# Patient Record
Sex: FEMALE | Race: WHITE | NOT HISPANIC OR LATINO | Employment: UNEMPLOYED | URBAN - NONMETROPOLITAN AREA
[De-identification: names, ages, dates, MRNs, and addresses within clinical notes are randomized per-mention and may not be internally consistent; named-entity substitution may affect disease eponyms.]

---

## 2023-07-25 ENCOUNTER — OFFICE VISIT (OUTPATIENT)
Dept: ORTHOPEDIC SURGERY | Facility: CLINIC | Age: 43
End: 2023-07-25
Payer: COMMERCIAL

## 2023-07-25 ENCOUNTER — HOSPITAL ENCOUNTER (OUTPATIENT)
Dept: GENERAL RADIOLOGY | Facility: HOSPITAL | Age: 43
Discharge: HOME | End: 2023-07-25
Admitting: PHYSICIAN ASSISTANT
Payer: COMMERCIAL

## 2023-07-25 VITALS — WEIGHT: 151.2 LBS | HEIGHT: 65 IN | BODY MASS INDEX: 25.19 KG/M2

## 2023-07-25 DIAGNOSIS — M25.512 LEFT SHOULDER PAIN, UNSPECIFIED CHRONICITY: ICD-10-CM

## 2023-07-25 DIAGNOSIS — R29.898 SHOULDER WEAKNESS: ICD-10-CM

## 2023-07-25 DIAGNOSIS — M25.512 CHRONIC LEFT SHOULDER PAIN: Primary | ICD-10-CM

## 2023-07-25 DIAGNOSIS — M25.612 DECREASED ROM OF LEFT SHOULDER: ICD-10-CM

## 2023-07-25 DIAGNOSIS — G89.29 CHRONIC LEFT SHOULDER PAIN: Primary | ICD-10-CM

## 2023-07-25 PROBLEM — M19.012 PRIMARY OSTEOARTHRITIS OF LEFT SHOULDER: Status: ACTIVE | Noted: 2022-12-08

## 2023-07-25 PROBLEM — Z90.710 POST-HYSTERECTOMY MENOPAUSE: Status: ACTIVE | Noted: 2023-07-12

## 2023-07-25 PROBLEM — L65.9 HAIR LOSS: Status: ACTIVE | Noted: 2022-08-08

## 2023-07-25 PROBLEM — R53.83 OTHER FATIGUE: Status: ACTIVE | Noted: 2022-08-08

## 2023-07-25 PROBLEM — M62.89 PELVIC FLOOR DYSFUNCTION: Status: ACTIVE | Noted: 2019-08-21

## 2023-07-25 PROBLEM — M79.18 MYOFASCIAL PAIN: Status: ACTIVE | Noted: 2023-06-22

## 2023-07-25 PROBLEM — E07.89 PAINFUL THYROID: Status: ACTIVE | Noted: 2022-08-08

## 2023-07-25 PROBLEM — H69.80 EUSTACHIAN TUBE DYSFUNCTION: Status: ACTIVE | Noted: 2021-06-23

## 2023-07-25 PROBLEM — K30 INDIGESTION: Status: ACTIVE | Noted: 2022-09-19

## 2023-07-25 PROBLEM — R94.39 ABNORMAL STRESS ECG WITH TREADMILL: Status: ACTIVE | Noted: 2022-02-16

## 2023-07-25 PROBLEM — K76.0 STEATOSIS OF LIVER: Status: ACTIVE | Noted: 2021-01-05

## 2023-07-25 PROBLEM — K21.9 GASTROESOPHAGEAL REFLUX DISEASE: Status: ACTIVE | Noted: 2023-06-22

## 2023-07-25 PROBLEM — K58.9 IRRITABLE BOWEL SYNDROME: Status: ACTIVE | Noted: 2018-01-12

## 2023-07-25 PROBLEM — R11.0 NAUSEA: Status: ACTIVE | Noted: 2019-04-17

## 2023-07-25 PROBLEM — M85.80 OSTEOPENIA: Status: ACTIVE | Noted: 2022-09-19

## 2023-07-25 PROBLEM — R06.2 WHEEZING: Status: ACTIVE | Noted: 2023-07-25

## 2023-07-25 PROBLEM — N95.1 MENOPAUSAL SYNDROME: Status: ACTIVE | Noted: 2022-09-19

## 2023-07-25 PROBLEM — F41.9 ANXIETY: Status: ACTIVE | Noted: 2021-10-07

## 2023-07-25 PROBLEM — R35.0 INCREASED FREQUENCY OF URINATION: Status: ACTIVE | Noted: 2022-09-19

## 2023-07-25 PROBLEM — J30.9 ALLERGIC RHINITIS: Status: ACTIVE | Noted: 2022-09-19

## 2023-07-25 PROBLEM — R10.13 EPIGASTRIC PAIN: Status: ACTIVE | Noted: 2023-07-11

## 2023-07-25 PROBLEM — E78.1 HYPERTRIGLYCERIDEMIA: Status: ACTIVE | Noted: 2021-06-23

## 2023-07-25 PROBLEM — Z98.890 PONV (POSTOPERATIVE NAUSEA AND VOMITING): Status: ACTIVE | Noted: 2023-07-19

## 2023-07-25 PROBLEM — R10.9 ABDOMINAL PAIN: Status: ACTIVE | Noted: 2023-07-25

## 2023-07-25 PROBLEM — E06.3 HASHIMOTO'S THYROIDITIS: Status: ACTIVE | Noted: 2022-08-08

## 2023-07-25 PROBLEM — F41.8 MIXED ANXIETY AND DEPRESSIVE DISORDER: Status: ACTIVE | Noted: 2021-06-23

## 2023-07-25 PROBLEM — J45.909 ASTHMA: Status: ACTIVE | Noted: 2019-11-03

## 2023-07-25 PROBLEM — B07.9 VERRUCA VULGARIS: Status: ACTIVE | Noted: 2022-09-19

## 2023-07-25 PROBLEM — R11.2 PONV (POSTOPERATIVE NAUSEA AND VOMITING): Status: ACTIVE | Noted: 2023-07-19

## 2023-07-25 PROBLEM — N30.10 CHRONIC INTERSTITIAL CYSTITIS: Status: ACTIVE | Noted: 2017-12-27

## 2023-07-25 PROBLEM — E89.40 POST-HYSTERECTOMY MENOPAUSE: Status: ACTIVE | Noted: 2023-07-12

## 2023-07-25 PROBLEM — R06.09 DYSPNEA ON EXERTION: Status: ACTIVE | Noted: 2022-02-16

## 2023-07-25 PROBLEM — K43.9 HERNIA OF ANTERIOR ABDOMINAL WALL: Status: ACTIVE | Noted: 2023-07-25

## 2023-07-25 PROBLEM — E78.5 HYPERLIPIDEMIA: Status: ACTIVE | Noted: 2023-07-25

## 2023-07-25 PROBLEM — I10 HYPERTENSIVE DISORDER: Status: ACTIVE | Noted: 2023-07-12

## 2023-07-25 PROBLEM — K44.9 HIATAL HERNIA: Status: ACTIVE | Noted: 2021-10-07

## 2023-07-25 PROBLEM — M94.0 COSTAL CHONDRITIS: Status: ACTIVE | Noted: 2022-09-19

## 2023-07-25 PROBLEM — R10.32 LEFT LOWER QUADRANT PAIN: Status: ACTIVE | Noted: 2023-07-25

## 2023-07-25 PROBLEM — M54.50 CHRONIC LEFT-SIDED LOW BACK PAIN WITHOUT SCIATICA: Status: ACTIVE | Noted: 2023-06-22

## 2023-07-25 PROBLEM — H69.90 EUSTACHIAN TUBE DYSFUNCTION: Status: ACTIVE | Noted: 2021-06-23

## 2023-07-25 PROBLEM — G47.00 INSOMNIA: Status: ACTIVE | Noted: 2022-09-19

## 2023-07-25 PROBLEM — R13.10 DYSPHAGIA: Status: ACTIVE | Noted: 2022-08-08

## 2023-07-25 PROBLEM — I10 BENIGN ESSENTIAL HTN: Status: ACTIVE | Noted: 2023-06-22

## 2023-07-25 PROBLEM — E04.1 THYROID NODULE: Status: ACTIVE | Noted: 2022-08-08

## 2023-07-25 PROBLEM — G58.8 CLUNEAL NEUROPATHY: Status: ACTIVE | Noted: 2023-06-22

## 2023-07-25 PROCEDURE — 73030 X-RAY EXAM OF SHOULDER: CPT

## 2023-07-25 RX ADMIN — METHYLPREDNISOLONE ACETATE 160 MG: 80 INJECTION, SUSPENSION INTRA-ARTICULAR; INTRALESIONAL; INTRAMUSCULAR; SOFT TISSUE at 16:26

## 2023-07-25 RX ADMIN — LIDOCAINE HYDROCHLORIDE 2 ML: 20 INJECTION, SOLUTION EPIDURAL; INFILTRATION; INTRACAUDAL; PERINEURAL at 16:26

## 2023-07-25 NOTE — PROGRESS NOTES
"Chief Complaint  Establish Care of the Left Shoulder    Subjective    History of Present Illness      Reyes England is a 42 y.o. female who presents to [facility] for complaint of Shoulder Pain: Patient complains of left shoulder pain.        This began in fall 2022. She denies any injury. The onset of pain was sudden. She reports pain and decreased range of motion. She notes weakness of the shoulder and reports not much improvement since onset. Dr. Hammer was giving her injections in the shoulder every 3 months, although it helped it never completely resolved her symptoms. She reports she had an MRI ordered by her PCP in 04/2022. She was informed she might have a labrum tear. Her pain is rated at a 9/10 and she reports it is painful to lay on the shoulder. The pain is located over the posterior and anterior aspects of the shoulder.      Objective   Vital Signs:   Ht 165.1 cm (65\")   Wt 68.6 kg (151 lb 3.2 oz)   BMI 25.16 kg/m²       Physical Exam  Vitals and nursing note reviewed.   Constitutional:       Appearance: Normal appearance.   Pulmonary:      Effort: Pulmonary effort is normal.   Skin:     General: Skin is warm and dry.      Capillary Refill: Capillary refill takes less than 2 seconds.   Neurological:      General: No focal deficit present.      Mental Status: He is alert and oriented to person, place, and time. Mental status is at baseline.   Psychiatric:         Mood and Affect: Mood normal.         Behavior: Behavior normal.         Thought Content: Thought content normal.         Judgment: Judgment normal.         Ortho Exam   LEFT shoulder  Range of motion is slightly compromised in forward flexion, abduction and external rotation.   Rotator cuff function appears well preserved.  The scapula is moving with the humerus upon attempted abduction.   The range of motion is abduction 0-110 degrees, forward flexion 0-110 degrees, external rotation 0-50 degrees.   There is " anterior joint line tenderness.   There is winging of the scapula.   Axillary nerve function is well preserved.   Radial artery pulses are palpable.       Result Review :   Radiologic studies - see below for interpretation  LEFT shoulder xrays   4 views were ordered by Hitesh Dan PA-C. Performed at [facility] on 7/25/2023. Images were independently viewed and interpreted by myself, my impression as follows:  Findings: Mild degenerative changes of the AC joint, glenohumeral joint appears well-preserved.  Bony lesion: no  Soft tissues: within normal limits  Joint spaces: subnormal  Hardware appropriately positioned: not applicable  Prior studies available for comparison: no    Reviewed MRI report of left shoulder without contrast, performed at  [facility] on 04/2022, summary of impression below:   AC joint narrowed with capsular hypertrophy and spurring  Type I acromion  Minimal mass effect, anterior downsloping  Mild supraspinatus tendinosis  Normal long head of biceps tendon, properly positioned within the bicipital groove  Some glenoid cartilage loss involving superior half, mild to moderate in severity  Minimal joint effusion  Labrum is suboptimally evaluated on nonarthrogram exam.  Suspect degenerative and linear tearing of the anterior inferior labrum, no other discrete linear tear.  Blunting of free edge margin of the posterior labrum from superior through inferior margin, could be degenerative          PROCEDURE  - Large Joint Arthrocentesis: L glenohumeral on 7/25/2023 4:26 PM  Indications: pain  Details: 25 G needle, posterior approach  Medications: 160 mg methylPREDNISolone acetate 80 MG/ML; 2 mL lidocaine PF 2% 2 %  Outcome: tolerated well, no immediate complications  Procedure, treatment alternatives, risks and benefits explained, specific risks discussed. Consent was given by the patient. Immediately prior to procedure a time out was called to verify the correct  patient, procedure, equipment, support staff and site/side marked as required.                Assessment   Assessment and Plan    Problem List Items Addressed This Visit          Musculoskeletal and Injuries    Chronic left shoulder pain - Primary    Relevant Orders    [place] Contrast Injection CT / MRI    MRI shoulder left arthrogram    - Large Joint Arthrocentesis: L glenohumeral    Decreased ROM of left shoulder    Relevant Orders    - Large Joint Arthrocentesis: L glenohumeral    Shoulder weakness       Follow Up   Discussion of any imaging in detail. Discussion of orthopaedic goals.  Risk, benefits, and merits of treatment alternatives reviewed with the patient. Treatment alternatives include: oral anti-inflammatories/NSAID, intra-articular steroid injection, physical therapy, and further imaging/testing.  Recommend further evaluation with MR arthrogram of left shoulder to better visualize the labrum.  I did discuss with patient if there is glenoid cartilage loss, most often labral surgery will not be recommended.  I also discussed with her my concern for possible early adhesive capsulitis.  We will proceed with intra-articular steroid injection and scheduling of MR arthrogram.  Ice, heat, and/or modalities as beneficial  Risks of minor surgical procedure/intra-articular injection, including but not limited to pain, bleeding, infection into the joint, pigment skin changes related to steroid administration, increased blood sugar levels secondary to steroid administration, scar, recurrence of pain, and tendon rupture associated with steroid administration and possible need for further surgery reviewed with patient.  She accepts these risks and wishes to proceed with procedure. Injected patient's left shoulder joint(s) with Depo-Medrol from a(n) posterior approach.  Patient tolerated the procedure well and no complications were encountered.   Watch for signs and symptoms of infection  Call or notify for any adverse  effect from injection therapy  Patient is encouraged to call or return for any issues or concerns.  Follow up will be based on when MRI has been performed  Patient was given instructions and counseling regarding her condition or for health maintenance advice. Please see specific information pulled into the AVS if appropriate.     Hitesh Dan PA-C    Date of Encounter: 7/25/2023     Transcribed from ambient dictation for Hitesh Dan PA-C by Viry Harrington.  07/25/23   18:23 EDT    Patient or patient representative verbalized consent to the visit recording.  I have personally performed the services described in this document as transcribed by the above individual, and it is both accurate and complete.  Hitesh Dan PA-C  7/26/2023  08:23 EDT

## 2023-07-26 ENCOUNTER — TELEPHONE (OUTPATIENT)
Dept: ORTHOPEDIC SURGERY | Facility: CLINIC | Age: 43
End: 2023-07-26

## 2023-07-26 RX ORDER — METHYLPREDNISOLONE ACETATE 80 MG/ML
160 INJECTION, SUSPENSION INTRA-ARTICULAR; INTRALESIONAL; INTRAMUSCULAR; SOFT TISSUE
Status: COMPLETED | OUTPATIENT
Start: 2023-07-25 | End: 2023-07-25

## 2023-07-26 RX ORDER — LIDOCAINE HYDROCHLORIDE 20 MG/ML
2 INJECTION, SOLUTION EPIDURAL; INFILTRATION; INTRACAUDAL; PERINEURAL
Status: COMPLETED | OUTPATIENT
Start: 2023-07-25 | End: 2023-07-25

## 2023-07-26 NOTE — TELEPHONE ENCOUNTER
Caller: Reyes England    Relationship: Self    Best call back number: [phone] (home)     Who are you requesting to speak with ([facility])    What was the call regarding: PATIENT SAW DR BRAVO YESTERDAY. SHE WAS GIVEN A   PHONE NUMBER TO CALL AND SCHEDULE AN APPOINTMENT FOR AN MRI shoulder left arthrogram  THE ONLY PLACE THEY DO IT IS IN [facility]. SHE DOESNT WANT TO GO TO [facility].I TRIED TRANSFER THE CALL TO [facility] AND LOST THE CALL. PLS GIVE PATIENT A CALL BACK.       I

## 2023-07-27 NOTE — TELEPHONE ENCOUNTER
I HONESTLY HAVE NO IDEA. I WILL CALL THE PATIENT BACK TO SEE WHAT FACILITY SHE HAS IN MIND AND THEN CHECK TO SEE IF THEY DO THE ARTHROGRAM/RJ

## 2023-09-18 ENCOUNTER — HOSPITAL ENCOUNTER (OUTPATIENT)
Dept: MRI IMAGING | Facility: HOSPITAL | Age: 43
Discharge: HOME | End: 2023-09-18
Payer: COMMERCIAL

## 2023-09-18 ENCOUNTER — HOSPITAL ENCOUNTER (OUTPATIENT)
Dept: INTERVENTIONAL RADIOLOGY/VASCULAR | Facility: HOSPITAL | Age: 43
Discharge: HOME | End: 2023-09-18
Payer: COMMERCIAL

## 2023-09-18 DIAGNOSIS — M25.512 CHRONIC LEFT SHOULDER PAIN: ICD-10-CM

## 2023-09-18 DIAGNOSIS — G89.29 CHRONIC LEFT SHOULDER PAIN: ICD-10-CM

## 2023-09-18 PROCEDURE — 25510000001 IOPAMIDOL 61 % SOLUTION: Performed by: PHYSICIAN ASSISTANT

## 2023-09-18 PROCEDURE — 0 GADOBENATE DIMEGLUMINE 529 MG/ML SOLUTION: Performed by: PHYSICIAN ASSISTANT

## 2023-09-18 PROCEDURE — A9577 INJ MULTIHANCE: HCPCS | Performed by: PHYSICIAN ASSISTANT

## 2023-09-18 PROCEDURE — 77002 NEEDLE LOCALIZATION BY XRAY: CPT

## 2023-09-18 PROCEDURE — 73222 MRI JOINT UPR EXTREM W/DYE: CPT

## 2023-09-18 RX ORDER — LIDOCAINE HYDROCHLORIDE 20 MG/ML
20 INJECTION, SOLUTION INFILTRATION; PERINEURAL ONCE
Status: COMPLETED | OUTPATIENT
Start: 2023-09-18 | End: 2023-09-18

## 2023-09-18 RX ADMIN — LIDOCAINE HYDROCHLORIDE 9 ML: 20 INJECTION, SOLUTION INFILTRATION; PERINEURAL at 09:30

## 2023-09-18 RX ADMIN — GADOBENATE DIMEGLUMINE 1 ML: 529 INJECTION, SOLUTION INTRAVENOUS at 09:30

## 2023-09-18 RX ADMIN — IOPAMIDOL 5 ML: 612 INJECTION, SOLUTION INTRATHECAL at 09:30

## 2023-09-18 RX ADMIN — SODIUM BICARBONATE 1 ML: 84 INJECTION, SOLUTION INTRAVENOUS at 09:30

## 2023-09-21 ENCOUNTER — TELEPHONE (OUTPATIENT)
Dept: ORTHOPEDIC SURGERY | Facility: CLINIC | Age: 43
End: 2023-09-21

## 2023-09-21 NOTE — TELEPHONE ENCOUNTER
UNABLE TO WT      Caller: DARIAN RASMUSSEN     Relationship to patient: PATIENT     Best call back number: [phone]    Chief complaint: LEFT SHOULDER     Type of visit: MRI FOLLOW UP     Requested date: 09/22/23 09:30    If rescheduling, when is the original appointment: 09/22/23     Additional notes:REQUESTING TELE VISIT FOR THIS SAME DATE TIME PLEASE    PLEASE CALL PATIENT ADVISE, THANK YOU

## 2023-09-22 ENCOUNTER — OFFICE VISIT (OUTPATIENT)
Dept: ORTHOPEDIC SURGERY | Facility: CLINIC | Age: 43
End: 2023-09-22
Payer: COMMERCIAL

## 2023-09-22 VITALS — WEIGHT: 151 LBS | HEIGHT: 65 IN | BODY MASS INDEX: 25.16 KG/M2

## 2023-09-22 DIAGNOSIS — G89.29 CHRONIC LEFT SHOULDER PAIN: ICD-10-CM

## 2023-09-22 DIAGNOSIS — M25.612 DECREASED ROM OF LEFT SHOULDER: Primary | ICD-10-CM

## 2023-09-22 DIAGNOSIS — M25.512 CHRONIC LEFT SHOULDER PAIN: ICD-10-CM

## 2023-09-22 PROCEDURE — 99213 OFFICE O/P EST LOW 20 MIN: CPT | Performed by: PHYSICIAN ASSISTANT

## 2023-09-22 NOTE — PROGRESS NOTES
"Chief Complaint  Results of the Left Shoulder    Subjective    History of Present Illness      Reyes England is a 42 y.o. female who presents to [facility] for follow up on left shoulder pain and MRI results. She reports the steroid injections do provide some relief, but do not completely take away her symptoms.   Today she reports no change in her symptoms.      HPI 7/25/23  Patient complains of left shoulder pain.      This began in fall 2022. She denies any injury. The onset of pain was sudden. She reports pain and decreased range of motion. She notes weakness of the shoulder and reports not much improvement since onset. Dr. Hammer was giving her injections in the shoulder every 3 months, although it helped it never completely resolved her symptoms. She reports she had an MRI ordered by her PCP in 04/2022. She was informed she might have a labrum tear. Her pain is rated at a 9/10 and she reports it is painful to lay on the shoulder. The pain is located over the posterior and anterior aspects of the shoulder.      Objective   Vital Signs:   Ht 165.1 cm (65\")   Wt 68.5 kg (151 lb)   BMI 25.13 kg/m²       Physical Exam  Vitals and nursing note reviewed.   Constitutional:       Appearance: Normal appearance.   Pulmonary:      Effort: Pulmonary effort is normal.   Skin:     General: Skin is warm and dry.      Capillary Refill: Capillary refill takes less than 2 seconds.   Neurological:      General: No focal deficit present.      Mental Status: He is alert and oriented to person, place, and time. Mental status is at baseline.   Psychiatric:         Mood and Affect: Mood normal.         Behavior: Behavior normal.         Thought Content: Thought content normal.         Judgment: Judgment normal.         Ortho Exam   LEFT shoulder  Range of motion is slightly compromised in forward flexion, abduction and external rotation.   Rotator cuff function appears well preserved.  The scapula is " moving with the humerus upon attempted abduction.   The range of motion is abduction 0-110 degrees, forward flexion 0-110 degrees, external rotation 0-50 degrees.   There is anterior joint line tenderness.   There is winging of the scapula.   Axillary nerve function is well preserved.   Radial artery pulses are palpable.       Result Review :   Radiologic studies - see below for interpretation  Results for orders placed during the hospital encounter of 09/18/23    MRI Shoulder Left Arthrogram    Narrative  PROCEDURE: MRI SHOULDER LEFT ARTHROGRAM    COMPARISON: [facility], , [place] CONTRAST INJECTION CT/MRI, 9/18/2023, 8:54.  [facility], , XR SHOULDER 2+ VW LEFT, 7/25/2023, 15:35.    INDICATIONS: Left shoulder weakness, pain, decreased ROM        TECHNIQUE: A variety of imaging planes and parameters were utilized for visualization of suspected  pathology.  Images were performed after intra-articular injection of a mixture of non-ionic  contrast and 0.1 cc of gadolinium contrast material.  The injection procedure is described in a  separate report.    FINDINGS:  No fracture or malalignment is identified.  Marrow signal appears normal.    The acromioclavicular joint appears normal.  No AC joint effusion is noted.  A type 2 acromion is  present.    A trace amount of fluid is noted in the subdeltoid/subacromial bursa.  Mild supraspinatus  tendinopathy is noted.  There is suspected fraying along the articular surface of the supraspinatus  tendon.  There is a small full-thickness tear of the superior aspect of the subscapularis tendon  measuring 0.2 cm in thickness.  No associated tendon retraction is seen.  The rotator cuff  otherwise appears unremarkable.  No muscle body atrophy is seen.    The biceps long head tendon and its attachment to the superior labrum are intact.  Just inferior to  the bicipital groove, there is a tear the coracohumeral ligament.  The biceps tendon remains  within  the bicipital groove.  T2 high signal is noted in the surrounding soft tissues consistent with  edema and or hemorrhage.    There is a small tear of the mid anterior labrum.  No contrast extends into the tear suggesting it  is healed over with granulation tissue.  The labrum otherwise appears unremarkable.    Cartilage in the joint is intact.  No loose body is evident.  Mild glenohumeral osteoarthritic  spurring is present.    Impression  1. Small tear of the mid anterior labrum healed over with granulation tissue  2. Tear of the coracohumeral ligament overlying the bicipital tendon at the inferior aspect of the  bicipital groove  3. Early glenohumeral osteoarthritic changes  4. Mild supraspinatus tendinopathy with articular surface fraying        Brendan Marie M.D.  Electronically Signed and Approved By: Brendan Marie M.D. on 9/18/2023 at 13:23        PRIOR IMAGING  LEFT shoulder xrays   4 views were ordered by Hitesh Dan PA-C. Performed at [facility] on 7/25/2023. Images were independently viewed and interpreted by myself, my impression as follows:  Findings: Mild degenerative changes of the AC joint, glenohumeral joint appears well-preserved.  Bony lesion: no  Soft tissues: within normal limits  Joint spaces: subnormal  Hardware appropriately positioned: not applicable  Prior studies available for comparison: no    Reviewed MRI report of left shoulder without contrast, performed at  [facility] on 04/2022, summary of impression below:   AC joint narrowed with capsular hypertrophy and spurring  Type I acromion  Minimal mass effect, anterior downsloping  Mild supraspinatus tendinosis  Normal long head of biceps tendon, properly positioned within the bicipital groove  Some glenoid cartilage loss involving superior half, mild to moderate in severity  Minimal joint effusion  Labrum is suboptimally evaluated on nonarthrogram exam.  Suspect degenerative and linear tearing  of the anterior inferior labrum, no other discrete linear tear.  Blunting of free edge margin of the posterior labrum from superior through inferior margin, could be degenerative          PROCEDURE  Procedures           Assessment   Assessment and Plan    Problem List Items Addressed This Visit          Musculoskeletal and Injuries    Chronic left shoulder pain    Decreased ROM of left shoulder - Primary     Follow Up   Discussion of any imaging in detail. Discussion of orthopaedic goals.  Discussed continuing steroid injections and possible referral to orthopedic that specializes in labral tears. She was interested in referral but did not want to after finding out she would have to go to [facility] or [place].  PRIOR VISIT: Risk, benefits, and merits of treatment alternatives reviewed with the patient. Treatment alternatives include: oral anti-inflammatories/NSAID, intra-articular steroid injection, physical therapy, and further imaging/testing.  Recommend further evaluation with MR arthrogram of left shoulder to better visualize the labrum.  I did discuss with patient if there is glenoid cartilage loss, most often labral surgery will not be recommended.  I also discussed with her my concern for possible early adhesive capsulitis.    Ice, heat, and/or modalities as beneficial  Watch for signs and symptoms of infection  Call or notify for any adverse effect from injection therapy  Patient is encouraged to call or return for any issues or concerns.  Follow up in 1 month for injection.  Patient was given instructions and counseling regarding her condition or for health maintenance advice. Please see specific information pulled into the AVS if appropriate.     Hitesh Dan PA-C    Date of Encounter: 9/22/2023       Electronically signed by Hitesh Dan PA-C, 09/22/23, 10:08 AM EDT.

## 2023-10-25 ENCOUNTER — CLINICAL SUPPORT (OUTPATIENT)
Dept: ORTHOPEDIC SURGERY | Facility: CLINIC | Age: 43
End: 2023-10-25
Payer: COMMERCIAL

## 2023-10-25 VITALS — TEMPERATURE: 98.6 F | WEIGHT: 151 LBS | BODY MASS INDEX: 25.16 KG/M2 | HEIGHT: 65 IN

## 2023-10-25 DIAGNOSIS — M25.512 CHRONIC LEFT SHOULDER PAIN: Primary | ICD-10-CM

## 2023-10-25 DIAGNOSIS — G89.29 CHRONIC LEFT SHOULDER PAIN: Primary | ICD-10-CM

## 2023-10-25 RX ORDER — METHYLPREDNISOLONE ACETATE 80 MG/ML
160 INJECTION, SUSPENSION INTRA-ARTICULAR; INTRALESIONAL; INTRAMUSCULAR; SOFT TISSUE
Status: COMPLETED | OUTPATIENT
Start: 2023-10-25 | End: 2023-10-25

## 2023-10-25 RX ORDER — LIDOCAINE HYDROCHLORIDE 10 MG/ML
2 INJECTION, SOLUTION EPIDURAL; INFILTRATION; INTRACAUDAL; PERINEURAL
Status: COMPLETED | OUTPATIENT
Start: 2023-10-25 | End: 2023-10-25

## 2023-10-25 RX ADMIN — METHYLPREDNISOLONE ACETATE 160 MG: 80 INJECTION, SUSPENSION INTRA-ARTICULAR; INTRALESIONAL; INTRAMUSCULAR; SOFT TISSUE at 09:09

## 2023-10-25 RX ADMIN — LIDOCAINE HYDROCHLORIDE 2 ML: 10 INJECTION, SOLUTION EPIDURAL; INFILTRATION; INTRACAUDAL; PERINEURAL at 09:09

## 2023-10-25 NOTE — PROGRESS NOTES
"Chief Complaint  Follow-up and Pain of the Left Shoulder    Subjective    History of Present Illness      Reyes England is a 42 y.o. female who presents to [facility] for injection therapy. She receives LEFT shoulder injections of Depo-Medrol. Since last injection, patient notes mild relief of symptoms. Treatment options have been discussed and she understands and consents.       Objective   Vital Signs:   Temp 98.6 °F (37 °C)   Ht 165.1 cm (65\")   Wt 68.5 kg (151 lb)   BMI 25.13 kg/m²     Physical Exam  42 y.o. female is awake, alert, oriented, in no acute distress and well developed, well nourished.  Ortho Exam   LEFT shoulder  Range of motion is slightly compromised in forward flexion, abduction and external rotation.   Rotator cuff function appears well preserved.  The scapula is moving with the humerus upon attempted abduction.   The range of motion is abduction 0-110 degrees, forward flexion 0-110 degrees, external rotation 0-50 degrees.   There is anterior joint line tenderness.   There is winging of the scapula.   Axillary nerve function is well preserved.   Radial artery pulses are palpable.         Result Review :        PROCEDURE  Large Joint Arthrocentesis: L glenohumeral  Date/Time: 10/25/2023 9:09 AM  Consent given by: patient  Site marked: site marked  Timeout: Immediately prior to procedure a time out was called to verify the correct patient, procedure, equipment, support staff and site/side marked as required   Supporting Documentation  Indications: pain   Procedure Details  Location: shoulder - L glenohumeral  Preparation: Patient was prepped and draped in the usual sterile fashion  Needle size: 25 G  Medications administered: 2 mL lidocaine PF 1% 1 %; 160 mg methylPREDNISolone acetate 80 MG/ML  Patient tolerance: patient tolerated the procedure well with no immediate complications             Injection site was identified by physical examination and cleaned with " Betadine and alcohol swabs. Prior to needle insertion, ethyl chloride spray was used for surface anesthesia. Sterile technique was used.       Assessment   Assessment and Plan    Problem List Items Addressed This Visit          Musculoskeletal and Injuries    Chronic left shoulder pain - Primary    Relevant Orders    Large Joint Arthrocentesis: L glenohumeral       Follow Up   Left shoulder Depo-Medrol injection was discussed with the patient. Discussed indication, risks, benefits, and alternatives. Verbal consent was given to proceed with the procedure.   Injection was performed from posterior approach.  Patient tolerated the procedure well and no complications were encountered.  Discussion of orthopedic goals and activities and patient/guardian expressed understanding.  Ice, heat, rest, compression and elevation of extremity as beneficial  nsaids and/or tylenol as beneficial  Instructed to refrain from heavy activity/rest the extremity for the next 24-48 hours  Discussion regarding possibility of cortisol flare and what to expect if this occurs  Watch for signs and symptoms of infection  Call if adverse effect from injection therapy  Follow up in 3 months  Patient was given instructions and counseling regarding her condition or for health maintenance advice. Please see specific information pulled into the AVS if appropriate.     Hitesh Dan PA-C   Date of Encounter: 10/25/2023     Electronically signed by Hitesh Dan PA-C, 10/25/23, 9:42 AM EDT.     EMR Dragon/Transcription disclaimer:  Much of this encounter note is an electronic transcription/translation of spoken language to printed text. The electronic translation of spoken language may permit erroneous, or at times, nonsensical words or phrases to be inadvertently transcribed; Although I have reviewed the note for such errors, some may still exist.

## 2024-01-02 ENCOUNTER — TELEPHONE (OUTPATIENT)
Dept: ORTHOPEDIC SURGERY | Facility: CLINIC | Age: 44
End: 2024-01-02

## 2024-01-02 NOTE — TELEPHONE ENCOUNTER
Caller: DARIAN RASMUSSEN     Relationship to patient: PATIENT     Best call back number: [phone]    Chief complaint: LEFT SHOULDER     Type of visit: INJECTION     Requested date: PATIENT DOESN'T WANT TO GO TO [place] AND IS REQUEST ONE LAST APPOINTMENT PRIOR TO OFFICE CLOSING      If rescheduling, when is the original appointment: 01/22/2024     Additional notes: UPDATED PATIENT'S PHONE NUMBER

## 2024-01-02 NOTE — TELEPHONE ENCOUNTER
SEE PATIENT'S MESSAGE.   DO YOU WANT TO WORK HER IN?      YOU ARE GETTING PRETTY FULL THIS WEEK.     BSW

## 2024-01-03 ENCOUNTER — TELEPHONE (OUTPATIENT)
Dept: ORTHOPEDIC SURGERY | Facility: CLINIC | Age: 44
End: 2024-01-03

## 2024-01-03 NOTE — TELEPHONE ENCOUNTER
Provider: SHELLY    Caller: DARIAN    Relationship to Patient: SELF    Pharmacy:     Phone Number: [phone]    Reason for Call: PT WANTS TO SEE AIMEE BEFORE SHE LEAVES, PLEASE ADVISE - TOLD PT I DON'T HAVE ANY OPEN APPOINTMENTS - OFFERED CHRISTI AND SUNDAY AND DIDN'T WANT THEM

## 2024-01-04 ENCOUNTER — TELEPHONE (OUTPATIENT)
Dept: ORTHOPEDIC SURGERY | Facility: CLINIC | Age: 44
End: 2024-01-04
Payer: COMMERCIAL

## 2024-01-04 NOTE — TELEPHONE ENCOUNTER
Returned call to patient.  Left voice mail asking her to return call to the office.     HUB MAY READ:  Hitesh does not have any open appointments available.   Her last day is 1/5/24.   The office will officially close on 1/15/24.   Both Hitesh and Dr Millan will be leaving [facility] but will remain local in the [facility].   Since patient has indicated that she does not want to continue care with [facility] or [facility], she can reach out to her PCP for information on obtaining contact info for Dr Millan and Hitesh's new location.     [place]

## 2024-01-04 NOTE — TELEPHONE ENCOUNTER
Hub staff attempted to follow warm transfer process and was unsuccessful     Caller: Reyes England    Relationship to patient: Self    Best call back number: [phone]    Patient is needing: CALLBACK REGARDING 1.22.24 APPT - [facility]

## 2024-01-08 NOTE — TELEPHONE ENCOUNTER
Call returned to patient.   Per Hitesh's note, the patient should not come in earlier for an injection.   Relayed this information to her and she understand that Hitesh will no longer be with [facility] but seeing patients there locally after 1/5/24.   She may contact them there to schedule follow up.       Patient is not interested in seeking care with other [facility] locations either in [facility] or [place].     [place]

## 2024-01-22 ENCOUNTER — OFFICE VISIT (OUTPATIENT)
Dept: CARDIOLOGY | Facility: CLINIC | Age: 44
End: 2024-01-22
Payer: COMMERCIAL

## 2024-01-22 VITALS
HEART RATE: 78 BPM | DIASTOLIC BLOOD PRESSURE: 68 MMHG | SYSTOLIC BLOOD PRESSURE: 101 MMHG | WEIGHT: 161.2 LBS | HEIGHT: 65 IN | BODY MASS INDEX: 26.86 KG/M2

## 2024-01-22 DIAGNOSIS — R07.2 PRECORDIAL PAIN: Primary | ICD-10-CM

## 2024-01-22 DIAGNOSIS — I95.89 OTHER SPECIFIED HYPOTENSION: ICD-10-CM

## 2024-01-22 PROCEDURE — 1160F RVW MEDS BY RX/DR IN RCRD: CPT | Performed by: INTERNAL MEDICINE

## 2024-01-22 PROCEDURE — 1159F MED LIST DOCD IN RCRD: CPT | Performed by: INTERNAL MEDICINE

## 2024-01-22 PROCEDURE — 93000 ELECTROCARDIOGRAM COMPLETE: CPT | Performed by: INTERNAL MEDICINE

## 2024-01-22 PROCEDURE — 3078F DIAST BP <80 MM HG: CPT | Performed by: INTERNAL MEDICINE

## 2024-01-22 PROCEDURE — 99204 OFFICE O/P NEW MOD 45 MIN: CPT | Performed by: INTERNAL MEDICINE

## 2024-01-22 PROCEDURE — 3074F SYST BP LT 130 MM HG: CPT | Performed by: INTERNAL MEDICINE

## 2024-01-22 RX ORDER — ESTRADIOL 1 MG/1
1 TABLET ORAL DAILY
COMMUNITY
Start: 2023-05-10

## 2024-01-22 RX ORDER — LINACLOTIDE 145 UG/1
145 CAPSULE, GELATIN COATED ORAL
COMMUNITY
Start: 2023-08-16

## 2024-01-22 RX ORDER — PRAVASTATIN SODIUM 20 MG
20 TABLET ORAL DAILY
COMMUNITY

## 2024-01-22 RX ORDER — CITALOPRAM 40 MG/1
40 TABLET ORAL DAILY
COMMUNITY

## 2024-01-22 RX ORDER — TOPIRAMATE 25 MG/1
1 TABLET ORAL
COMMUNITY

## 2024-01-22 RX ORDER — GABAPENTIN 300 MG/1
1 CAPSULE ORAL 3 TIMES DAILY
COMMUNITY

## 2024-01-22 RX ORDER — OMEPRAZOLE 40 MG/1
1 CAPSULE, DELAYED RELEASE ORAL DAILY
COMMUNITY

## 2024-01-22 RX ORDER — MONTELUKAST SODIUM 10 MG/1
10 TABLET ORAL DAILY
COMMUNITY

## 2024-01-22 RX ORDER — LEVOTHYROXINE SODIUM 0.03 MG/1
25 TABLET ORAL DAILY
COMMUNITY
Start: 2023-02-14

## 2024-01-22 RX ORDER — HYDROXYZINE HYDROCHLORIDE 10 MG/1
10 TABLET, FILM COATED ORAL EVERY 6 HOURS PRN
COMMUNITY

## 2024-01-22 NOTE — PROGRESS NOTES
Chief Complaint  new patient, low bp , Shortness of Breath, and Chest Pain    Subjective            Reyes England presents to [facility] CARDIOLOGY  Shortness of Breath  Associated symptoms include chest pain.   Chest Pain   Associated symptoms include palpitations and shortness of breath.       43-year-old white female.  She was following with a different cardiologist previously.  She has had some intermittent atypical chest pains over the years based on old records.  She had normal coronary angiography in 2018 and negative stress imaging in 2022.  She continues to have intermittent episodes of chest pressure, somewhat fatigued and short of breath.  She reports her blood pressures at home are staying low in the 80s over 50s associated with dizziness.  She is not on any blood pressure lowering medication.  No palpitations or fluid retention.  She seems extremely anxious at baseline.    PMH  History reviewed. No pertinent past medical history.      SURGICALHX  History reviewed. No pertinent surgical history.     SOC  Social History     Socioeconomic History    Marital status: Unknown   Tobacco Use    Smoking status: Never     Passive exposure: Never    Smokeless tobacco: Never   Vaping Use    Vaping Use: Never used   Substance and Sexual Activity    Alcohol use: Never    Drug use: Never    Sexual activity: Defer         FAMHX  Family History   Problem Relation Age of Onset    No Known Problems Mother     Heart attack Father           ALLERGY  Allergies   Allergen Reactions    Penicillins Hives, Rash and Unknown (See Comments)        MEDSCURRENT    Current Outpatient Medications:     citalopram (CeleXA) 40 MG tablet, Take 1 tablet by mouth Daily., Disp: , Rfl:     estradiol (ESTRACE) 1 MG tablet, Take 1 tablet by mouth Daily., Disp: , Rfl:     gabapentin (NEURONTIN) 300 MG capsule, Take 1 capsule by mouth 3 (Three) Times a Day., Disp: , Rfl:     hydrOXYzine (ATARAX) 10 MG tablet, Take 1 tablet by  "mouth Every 6 (Six) Hours As Needed., Disp: , Rfl:     levothyroxine (SYNTHROID, LEVOTHROID) 25 MCG tablet, Take 1 tablet by mouth Daily., Disp: , Rfl:     Linzess 145 MCG capsule capsule, Take 1 capsule by mouth Every Morning Before Breakfast., Disp: , Rfl:     montelukast (SINGULAIR) 10 MG tablet, Take 1 tablet by mouth Daily., Disp: , Rfl:     omeprazole (priLOSEC) 40 MG capsule, Take 1 capsule by mouth Daily., Disp: , Rfl:     pravastatin (PRAVACHOL) 20 MG tablet, Take 1 tablet by mouth Daily., Disp: , Rfl:     topiramate (TOPAMAX) 25 MG tablet, Take 1 tablet by mouth every night at bedtime., Disp: , Rfl:       Review of Systems   Constitutional: Negative.   HENT: Negative.     Eyes: Negative.    Cardiovascular:  Positive for chest pain and palpitations.   Respiratory: Negative.  Positive for shortness of breath.    Endocrine: Negative.    Hematologic/Lymphatic: Negative.    Skin: Negative.    Musculoskeletal: Negative.    Gastrointestinal: Negative.    Genitourinary: Negative.    Neurological: Negative.    Psychiatric/Behavioral: Negative.          Objective     /68   Pulse 78   Ht 165.1 cm (65\")   Wt 73.1 kg (161 lb 3.2 oz)   BMI 26.83 kg/m²       General Appearance:   well developed  well nourished  HENT:   oropharynx moist  lips not cyanotic  Neck:  thyroid not enlarged  supple  Respiratory:  no respiratory distress  normal breath sounds  no rales  Cardiovascular:  no jugular venous distention  regular rhythm  apical impulse normal  S1 normal, S2 normal  no S3, no S4   no murmur  no rub, no thrill  carotid pulses normal; no bruit  pedal pulses normal  lower extremity edema: none    Musculoskeletal:  no clubbing of fingers.   normocephalic, head atraumatic  Skin:   warm, dry  Psychiatric:  judgement and insight appropriate  normal mood and affect      Result Review :     The following data was reviewed by: Macho Leon MD on 01/22/2024:              Data reviewed : Primary care " records reviewed, laboratory studies reviewed, previous cardiac testing reviewed        ECG 12 Lead    Date/Time: 1/22/2024 3:12 PM  Performed by: JOSE ROBERTO Leon MD    Authorized by: JOSE ROBERTO Leon MD  Comparison: not compared with previous ECG   Previous ECG: no previous ECG available  Rhythm: sinus rhythm  Conduction: conduction normal  ST Segments: ST segments normal  T Waves: T waves normal  QRS axis: normal  Other findings: non-specific ST-T wave changes    Clinical impression: non-specific ECG                    Assessment and Plan        ASSESSMENT:  Encounter Diagnoses   Name Primary?    Precordial pain Yes    Other specified hypotension          PLAN:    1.  Precordial pain with shortness of breath-atypical in pattern.  She has had relatively extensive cardiac testing over the years that has not shown any pathology.  We will schedule an echo and send basic labs because she is having reported hypotension.  I asked her to increase her volume intake today and try to become a little bit more liberal with salt intake.  2.  We will discuss the diagnostic results when available, will arrange a follow-up after diagnostics are completed        Patient was given instructions and counseling regarding her condition or for health maintenance advice. Please see specific information pulled into the AVS if appropriate.             JOSE ROBERTO Leon MD  1/22/2024    15:10 EST

## 2024-02-02 DIAGNOSIS — I95.89 OTHER SPECIFIED HYPOTENSION: ICD-10-CM

## 2024-02-02 DIAGNOSIS — R07.2 PRECORDIAL PAIN: ICD-10-CM

## 2024-02-07 ENCOUNTER — OFFICE VISIT (OUTPATIENT)
Dept: CARDIOLOGY | Facility: CLINIC | Age: 44
End: 2024-02-07
Payer: COMMERCIAL

## 2024-02-07 VITALS
SYSTOLIC BLOOD PRESSURE: 113 MMHG | HEART RATE: 56 BPM | DIASTOLIC BLOOD PRESSURE: 73 MMHG | WEIGHT: 161.2 LBS | BODY MASS INDEX: 26.86 KG/M2 | HEIGHT: 65 IN

## 2024-02-07 DIAGNOSIS — R07.89 CHEST PAIN, ATYPICAL: Primary | ICD-10-CM

## 2024-02-07 PROCEDURE — 1159F MED LIST DOCD IN RCRD: CPT | Performed by: NURSE PRACTITIONER

## 2024-02-07 PROCEDURE — 3078F DIAST BP <80 MM HG: CPT | Performed by: NURSE PRACTITIONER

## 2024-02-07 PROCEDURE — 99213 OFFICE O/P EST LOW 20 MIN: CPT | Performed by: NURSE PRACTITIONER

## 2024-02-07 PROCEDURE — 1160F RVW MEDS BY RX/DR IN RCRD: CPT | Performed by: NURSE PRACTITIONER

## 2024-02-07 PROCEDURE — 3074F SYST BP LT 130 MM HG: CPT | Performed by: NURSE PRACTITIONER

## 2024-02-07 NOTE — PROGRESS NOTES
Chief Complaint  Follow-up (Testing follow up )    Lexi England presents to [facility] CARDIOLOGY  History of Present Illness    Ms. England is here for follow-up on chest pain.  She reports intermittent chest pains over the past several years.  She had a normal coronary catheterization in 2018 and negative stress imaging in 2022.  Since her last visit she had an echocardiogram that showed normal LV function, normal valve function.  She reports for the last month she has had worsening chest pressure/tightness which occurs on a daily basis and is pretty much constant.  With associated fatigue and shortness of breath.  She reports a family history of coronary artery disease that was premature so she is concerned about that.  She denies palpitations.    PMH  History reviewed. No pertinent past medical history.      SURGICALHX  History reviewed. No pertinent surgical history.     SOC  Social History     Socioeconomic History    Marital status: Unknown   Tobacco Use    Smoking status: Never     Passive exposure: Never    Smokeless tobacco: Never   Vaping Use    Vaping Use: Never used   Substance and Sexual Activity    Alcohol use: Never    Drug use: Never    Sexual activity: Defer         FAMHX  Family History   Problem Relation Age of Onset    No Known Problems Mother     Heart attack Father           ALLERGY  Allergies   Allergen Reactions    Penicillins Hives, Rash and Unknown (See Comments)        MEDSCURRENT    Current Outpatient Medications:     citalopram (CeleXA) 40 MG tablet, Take 1 tablet by mouth Daily., Disp: , Rfl:     estradiol (ESTRACE) 1 MG tablet, Take 1 tablet by mouth Daily., Disp: , Rfl:     hydrOXYzine (ATARAX) 10 MG tablet, Take 1 tablet by mouth Every 6 (Six) Hours As Needed., Disp: , Rfl:     levothyroxine (SYNTHROID, LEVOTHROID) 25 MCG tablet, Take 1 tablet by mouth Daily., Disp: , Rfl:     Linzess 145 MCG capsule capsule, Take 1 capsule by mouth Every  "Morning Before Breakfast., Disp: , Rfl:     montelukast (SINGULAIR) 10 MG tablet, Take 1 tablet by mouth Daily., Disp: , Rfl:     omeprazole (priLOSEC) 40 MG capsule, Take 1 capsule by mouth Daily., Disp: , Rfl:     pravastatin (PRAVACHOL) 20 MG tablet, Take 1 tablet by mouth Daily., Disp: , Rfl:     topiramate (TOPAMAX) 25 MG tablet, Take 1 tablet by mouth every night at bedtime., Disp: , Rfl:     gabapentin (NEURONTIN) 300 MG capsule, Take 1 capsule by mouth 3 (Three) Times a Day. (Patient not taking: Reported on 2/7/2024), Disp: , Rfl:       Review of Systems   Constitutional: Positive for malaise/fatigue.   Cardiovascular:  Positive for chest pain and dyspnea on exertion.   Respiratory:  Positive for shortness of breath.    Neurological:  Positive for dizziness.        Objective     /73 (BP Location: Left arm)   Pulse 56   Ht 165.1 cm (65\")   Wt 73.1 kg (161 lb 3.2 oz)   BMI 26.83 kg/m²       General Appearance:   well developed  well nourished  HENT:   oropharynx moist  lips not cyanotic  Neck:  thyroid not enlarged  supple  Respiratory:  no respiratory distress  normal breath sounds  no rales  Cardiovascular:  no jugular venous distention  regular rhythm  apical impulse normal  S1 normal, S2 normal  no S3, no S4   no murmur  no rub, no thrill  carotid pulses normal; no bruit  pedal pulses normal  lower extremity edema: none    Musculoskeletal:  no clubbing of fingers.   normocephalic, head atraumatic  Skin:   warm, dry  Psychiatric:  judgement and insight appropriate  normal mood and affect      Result Review :     The following data was reviewed by: RICHARD Bartlett on 02/07/2024:              Data reviewed : Cardiology studies cardiac testing reviewed above baseline EKG showed sinus rhythm with nonspecific ST-T wave changes.    Procedures      Reyes England  reports that she has never smoked. She has never been exposed to tobacco smoke. She has never used smokeless tobacco.. I have " educated her on the risk of diseases from using tobacco products such as cancer, COPD, and heart disease.                   Assessment and Plan        ASSESSMENT:  Encounter Diagnosis   Name Primary?    Chest pain, atypical Yes         PLAN:    1.  Atypical chest pain-she has had intermittent chest pain for years however she reports more consistent symptoms for the last month.  Sometimes worse with exertion but reports the pain is pretty consistent throughout the day associated with fatigue and shortness of breath.  Symptoms are more atypical for angina.  She has had a cardiac catheterization in 2018 that was normal stress imaging in 2022 negative.  Echocardiogram was structurally normal recently.  Considering her family history, I offered to do a cardiac CT calcium score to evaluate plaque burden and also help guide secondary prevention measures.  She is agreeable to this plan.  Ultimately if this score comes back low or 0 would recommend evaluating for noncardiac causes for chest pain.  2.  Follow-up arranged.            Patient was given instructions and counseling regarding her condition or for health maintenance advice. Please see specific information pulled into the AVS if appropriate.           Juany Sher, APRN   2/7/2024  11:44 EST

## 2024-02-13 ENCOUNTER — TELEPHONE (OUTPATIENT)
Dept: CARDIOLOGY | Facility: CLINIC | Age: 44
End: 2024-02-13
Payer: COMMERCIAL

## 2024-03-27 ENCOUNTER — OFFICE VISIT (OUTPATIENT)
Dept: CARDIOLOGY | Facility: CLINIC | Age: 44
End: 2024-03-27
Payer: COMMERCIAL

## 2024-03-27 VITALS
HEART RATE: 69 BPM | SYSTOLIC BLOOD PRESSURE: 114 MMHG | HEIGHT: 65 IN | BODY MASS INDEX: 26.82 KG/M2 | WEIGHT: 161 LBS | OXYGEN SATURATION: 96 % | DIASTOLIC BLOOD PRESSURE: 74 MMHG

## 2024-03-27 DIAGNOSIS — R07.89 CHEST PAIN, ATYPICAL: Primary | ICD-10-CM

## 2024-03-27 DIAGNOSIS — R06.09 DYSPNEA ON EXERTION: ICD-10-CM

## 2024-03-27 NOTE — PROGRESS NOTES
Chief Complaint  Shortness of Breath, Chest Pain, and Follow-up (1 month )    Subjective            Reyes England presents to [facility] CARDIOLOGY  History of Present Illness    Ms. England is here for follow-up on chest pain, dyspnea on exertion.  She has had intermittent chest pains for some time, mostly exertional described as a sharp pain accompanied with shortness of breath.  She previously had a normal coronary catheterization in 2018 and negative stress imaging in 2022.  Echocardiogram was normal.  She has a history of premature CAD in her family.  She has never smoked herself but has been exposed to secondhand smoke.    PMH  History reviewed. No pertinent past medical history.      SURGICALHX  History reviewed. No pertinent surgical history.     SOC  Social History     Socioeconomic History    Marital status: Unknown   Tobacco Use    Smoking status: Never     Passive exposure: Never    Smokeless tobacco: Never   Vaping Use    Vaping status: Never Used   Substance and Sexual Activity    Alcohol use: Never    Drug use: Never    Sexual activity: Defer         FAMHX  Family History   Problem Relation Age of Onset    No Known Problems Mother     Heart attack Father           ALLERGY  Allergies   Allergen Reactions    Penicillins Hives, Rash and Unknown (See Comments)        MEDSCURRENT    Current Outpatient Medications:     estradiol (ESTRACE) 1 MG tablet, Take 1 tablet by mouth Daily., Disp: , Rfl:     levothyroxine (SYNTHROID, LEVOTHROID) 25 MCG tablet, Take 1 tablet by mouth Daily., Disp: , Rfl:     Linzess 145 MCG capsule capsule, Take 1 capsule by mouth Every Morning Before Breakfast., Disp: , Rfl:     montelukast (SINGULAIR) 10 MG tablet, Take 1 tablet by mouth Daily., Disp: , Rfl:     pravastatin (PRAVACHOL) 20 MG tablet, Take 1 tablet by mouth Daily., Disp: , Rfl:     topiramate (TOPAMAX) 25 MG tablet, Take 1 tablet by mouth every night at bedtime., Disp: , Rfl:       Review of Systems  "  Cardiovascular:  Positive for chest pain and dyspnea on exertion. Negative for leg swelling, near-syncope, palpitations and syncope.   Respiratory:  Positive for shortness of breath.         Objective     /74   Pulse 69   Ht 165.1 cm (65\")   Wt 73 kg (161 lb)   SpO2 96%   BMI 26.79 kg/m²       General Appearance:   well developed  well nourished  HENT:   oropharynx moist  lips not cyanotic  Neck:  thyroid not enlarged  supple  Respiratory:  no respiratory distress  normal breath sounds  no rales  Cardiovascular:  no jugular venous distention  regular rhythm  apical impulse normal  S1 normal, S2 normal  no S3, no S4   no murmur  no rub, no thrill  carotid pulses normal; no bruit  pedal pulses normal  lower extremity edema: none    Musculoskeletal:  no clubbing of fingers.   normocephalic, head atraumatic  Skin:   warm, dry  Psychiatric:  judgement and insight appropriate  normal mood and affect      Result Review :     The following data was reviewed by: RICHARD Bartlett on 03/27/2024:              Data reviewed : Cardiology studies previous cardiac testing reviewed above.      Procedures      Reyes England  reports that she has never smoked. She has never been exposed to tobacco smoke. She has never used smokeless tobacco. I have educated her on the risk of diseases from using tobacco products such as cancer, COPD, and heart disease.              Assessment and Plan        ASSESSMENT:  Encounter Diagnoses   Name Primary?    Chest pain, atypical Yes    Dyspnea on exertion          PLAN:    1.  Chest pain-mostly exertional, described as a sharp shooting pain.  Stress test showed no evidence of ischemia in 2022 and she had a normal left heart catheterization in 2018.  For further assessment we will order a coronary CTA.  2.  Dyspnea on exertion-assessing for ischemia as above.  She has not smoked but has been exposed to secondhand smoke.  I am also going to order pulmonary function testing and " if abnormal will refer to pulmonology.  3.  Follow-up after testing.          Patient was given instructions and counseling regarding her condition or for health maintenance advice. Please see specific information pulled into the AVS if appropriate.           Juany Sher, APRN   3/27/2024  10:46 EDT

## 2024-03-29 ENCOUNTER — TELEPHONE (OUTPATIENT)
Dept: CARDIOLOGY | Facility: CLINIC | Age: 44
End: 2024-03-29
Payer: COMMERCIAL

## 2024-03-29 NOTE — TELEPHONE ENCOUNTER
Called pt asked if she was trying to schedule CT calcium score. She stated that she is supposed to have a CT done but not the calcium score. I  told her I would send a message to see what needed to be ordered as I did not see a CT order in her chart.

## 2024-03-29 NOTE — TELEPHONE ENCOUNTER
Caller: Reyes England    Relationship to patient: Self    Best call back number: [phone] (home)     Chief complaint: NO CALL TO SCHEDULE    Type of visit: CT SCAN    Requested date: ASAP     Additional notes: PT STATED SHE HAS NOT RECEIVED A CALL YET TO SCHEDULE HER CT SCAN. PLEASE CALL HER BACK ON HOW TO SCHEDULE, THANK YOU

## 2024-04-01 NOTE — TELEPHONE ENCOUNTER
I have a ticket in with [facility] on this, there is  no referral to work this CT. Will call [facility] scheduling to get scheduled.

## 2024-04-04 ENCOUNTER — TELEPHONE (OUTPATIENT)
Dept: CARDIOLOGY | Facility: CLINIC | Age: 44
End: 2024-04-04
Payer: COMMERCIAL

## 2024-04-04 DIAGNOSIS — R07.89 CHEST PAIN, ATYPICAL: ICD-10-CM

## 2024-04-04 RX ORDER — LIDOCAINE HYDROCHLORIDE 10 MG/ML
0.5 INJECTION, SOLUTION INFILTRATION; PERINEURAL ONCE AS NEEDED
OUTPATIENT
Start: 2024-04-04

## 2024-04-04 RX ORDER — NITROGLYCERIN 0.4 MG/1
0.4 TABLET SUBLINGUAL
OUTPATIENT
Start: 2024-04-04 | End: 2024-04-04

## 2024-04-04 RX ORDER — METOPROLOL TARTRATE 1 MG/ML
5 INJECTION, SOLUTION INTRAVENOUS
OUTPATIENT
Start: 2024-04-04

## 2024-04-04 RX ORDER — SODIUM CHLORIDE 0.9 % (FLUSH) 0.9 %
10 SYRINGE (ML) INJECTION AS NEEDED
OUTPATIENT
Start: 2024-04-04

## 2024-04-04 RX ORDER — NITROGLYCERIN 0.4 MG/1
0.8 TABLET SUBLINGUAL
OUTPATIENT
Start: 2024-04-04

## 2024-04-04 RX ORDER — SODIUM CHLORIDE 0.9 % (FLUSH) 0.9 %
10 SYRINGE (ML) INJECTION EVERY 12 HOURS SCHEDULED
OUTPATIENT
Start: 2024-04-04

## 2024-04-04 RX ORDER — METOPROLOL TARTRATE 50 MG/1
TABLET, FILM COATED ORAL
Qty: 4 TABLET | Refills: 0 | Status: SHIPPED | OUTPATIENT
Start: 2024-04-04

## 2024-04-04 RX ORDER — SODIUM CHLORIDE 9 MG/ML
40 INJECTION, SOLUTION INTRAVENOUS AS NEEDED
OUTPATIENT
Start: 2024-04-04

## 2024-04-04 RX ORDER — METOPROLOL TARTRATE 50 MG/1
50 TABLET, FILM COATED ORAL
OUTPATIENT
Start: 2024-04-04

## 2024-04-04 NOTE — TELEPHONE ENCOUNTER
I guess we need to reach out and see what order they need?  My order is for a coronary CTA which is what I am wanting on this patient.

## 2024-04-04 NOTE — TELEPHONE ENCOUNTER
Caller: Reyes England    Relationship: Self    Best call back number: [phone] (home)      What is the best time to reach you: ANYTIME     Who are you requesting to speak with (clinical staff, provider,  specific staff member):        What was the call regarding: PATIENT HAD A CT ANGIOGRAM SCHEDULED BUT PATIENT WAS TOLD WRONG ORDER WAS SENT AND TO REACH BACK OUT TO OFFICE.     Is it okay if the provider responds through [facility]: YES

## 2024-04-05 DIAGNOSIS — R07.89 CHEST PAIN, ATYPICAL: ICD-10-CM

## 2024-04-05 RX ORDER — METOPROLOL TARTRATE 50 MG/1
TABLET, FILM COATED ORAL
Qty: 4 TABLET | Refills: 0 | OUTPATIENT
Start: 2024-04-05

## 2024-04-09 ENCOUNTER — TELEPHONE (OUTPATIENT)
Dept: CARDIOLOGY | Facility: CLINIC | Age: 44
End: 2024-04-09
Payer: COMMERCIAL

## 2024-04-09 DIAGNOSIS — R06.09 DYSPNEA ON EXERTION: ICD-10-CM

## 2024-04-16 ENCOUNTER — TELEPHONE (OUTPATIENT)
Dept: CARDIOLOGY | Facility: CLINIC | Age: 44
End: 2024-04-16
Payer: COMMERCIAL

## 2024-04-16 NOTE — TELEPHONE ENCOUNTER
Caller: DARIAN    Relationship: SELF    Best call back number: [phone]    Caller requesting test results: PFT    What test was performed: PFT    When was the test performed: 4.9.24    Where was the test performed: [facility]    Additional notes: PATIENT CALLING IN TO SEE IF THE RESULTS OF THE PFT TESTING IS DONE AND WHAT ARE THE RESULTS. PLEASE CALL PATIENT. THANK YOU!

## 2024-04-17 NOTE — TELEPHONE ENCOUNTER
[facility] with pt an [facility] per RICHARD Albert : Pulmonary function testing reviewed.  Results show a mild obstructive defect.     will defer management to them.      Pt verb understanding and no questions asked

## 2024-05-29 ENCOUNTER — TELEPHONE (OUTPATIENT)
Dept: CARDIOLOGY | Facility: CLINIC | Age: 44
End: 2024-05-29
Payer: COMMERCIAL

## 2024-05-29 NOTE — TELEPHONE ENCOUNTER
Caller: Reyes England    Relationship: Self    Best call back number:[phone] (home)      What is the best time to reach you: ANYTIME     Who are you requesting to speak with (clinical staff, provider,  specific staff member): CLINICAL         What was the call regarding: PATIENT NEEDS TO KNOW HOW TO TAKE METOPROLOL THE NIGHT BEFORE CT IMAGING ON 6/14/24. PLEASE CALL PATIENT TO ADVISE.     Is it okay if the provider responds through MyChart: YES

## 2024-06-04 NOTE — PROGRESS NOTES
06/14/24 0001   Pre-Procedure Phone Call   Procedure Time Verified Yes   Arrival Time 1300   Procedure Location Verified Yes   Medical History Reviewed No   NPO Status Reinforced Yes   Ride and Caregiver Arranged N/A   Patient Knows to Bring Current Medications   (Meds ordered for CTA)   Bring Outside Films Requested   (Dr Duke to read)

## 2024-06-14 ENCOUNTER — HOSPITAL ENCOUNTER (OUTPATIENT)
Dept: CT IMAGING | Facility: HOSPITAL | Age: 44
Discharge: HOME | End: 2024-06-14
Payer: COMMERCIAL

## 2024-07-09 ENCOUNTER — TELEPHONE (OUTPATIENT)
Dept: CARDIOLOGY | Facility: CLINIC | Age: 44
End: 2024-07-09
Payer: COMMERCIAL

## 2024-07-09 NOTE — TELEPHONE ENCOUNTER
Caller: Reyes England    Relationship: Self    Best call back number: [phone]    What is the best time to reach you: ANY    Who are you requesting to speak with (clinical staff, provider,  specific staff member): ANY    What was the call regarding: PATIENT HAS HER CT ANGIOGRAM SCHEDULED FOR 9-10-24.  IS THERE SOMEWHERE ELSE SHE CAN GO THAT CAN GET HER IN SOONER?    Is it okay if the provider responds through [facility]: YES

## 2024-07-30 DIAGNOSIS — R07.89 CHEST PAIN, ATYPICAL: ICD-10-CM

## 2024-07-31 ENCOUNTER — TELEPHONE (OUTPATIENT)
Dept: CARDIOLOGY | Facility: CLINIC | Age: 44
End: 2024-07-31
Payer: COMMERCIAL

## 2024-07-31 DIAGNOSIS — R07.89 CHEST PAIN, ATYPICAL: ICD-10-CM

## 2024-07-31 NOTE — TELEPHONE ENCOUNTER
Caller: Reyes England    Relationship to patient: Self    Best call back number: [phone]     Chief complaint: PATIENT HAD THE CTA PERFORMED AT [facility] ON 07.29.24. SHE IS REQUESTING A FOLLOW UP TO GO OVER THE RESULTS.    Type of visit: FOLLOW UP    Requested date: ASAP     Additional notes:PATIENT DOES NOT HAVE ANY SCHEDULING PREFERENCES.

## 2024-08-07 ENCOUNTER — OFFICE VISIT (OUTPATIENT)
Dept: CARDIOLOGY | Facility: CLINIC | Age: 44
End: 2024-08-07
Payer: COMMERCIAL

## 2024-08-07 VITALS
BODY MASS INDEX: 28.76 KG/M2 | HEART RATE: 72 BPM | SYSTOLIC BLOOD PRESSURE: 114 MMHG | WEIGHT: 172.6 LBS | DIASTOLIC BLOOD PRESSURE: 76 MMHG | HEIGHT: 65 IN

## 2024-08-07 DIAGNOSIS — R06.09 DYSPNEA ON EXERTION: ICD-10-CM

## 2024-08-07 DIAGNOSIS — R07.2 PRECORDIAL PAIN: Primary | ICD-10-CM

## 2024-08-07 DIAGNOSIS — R07.89 CHEST PAIN, ATYPICAL: ICD-10-CM

## 2024-08-07 PROCEDURE — 1159F MED LIST DOCD IN RCRD: CPT | Performed by: NURSE PRACTITIONER

## 2024-08-07 PROCEDURE — 1160F RVW MEDS BY RX/DR IN RCRD: CPT | Performed by: NURSE PRACTITIONER

## 2024-08-07 PROCEDURE — 3074F SYST BP LT 130 MM HG: CPT | Performed by: NURSE PRACTITIONER

## 2024-08-07 PROCEDURE — 99214 OFFICE O/P EST MOD 30 MIN: CPT | Performed by: NURSE PRACTITIONER

## 2024-08-07 PROCEDURE — 3078F DIAST BP <80 MM HG: CPT | Performed by: NURSE PRACTITIONER

## 2024-08-07 NOTE — PROGRESS NOTES
Chief Complaint  chest pain, atypical; Follow-up (From CTA); and Shortness of Breath    Subjective            Reyes England presents to [facility] CARDIOLOGY  History of Present Illness    Ms. Cooper is here for follow-up evaluation management of atypical chest pain and dyspnea on exertion.  She continues to have intermittent, sharp chest pains either with exertion or when she lays down.  She is most concerned however with shortness of breath which is persistent.    She had a normal coronary catheterization in 2018 and negative stress imaging in 2022.  Echocardiogram was also normal.  She reports a premature CAD history in her family, a coronary CTA was ordered.  Calcium score was 0.    PMH  History reviewed. No pertinent past medical history.      SURGICALHX  History reviewed. No pertinent surgical history.     SOC  Social History     Socioeconomic History    Marital status: Unknown   Tobacco Use    Smoking status: Never     Passive exposure: Never    Smokeless tobacco: Never   Vaping Use    Vaping status: Never Used   Substance and Sexual Activity    Alcohol use: Never    Drug use: Never    Sexual activity: Defer         FAMHX  Family History   Problem Relation Age of Onset    No Known Problems Mother     Heart attack Father           ALLERGY  Allergies   Allergen Reactions    Penicillins Hives, Rash and Unknown (See Comments)        MEDSCURRENT    Current Outpatient Medications:     estradiol (ESTRACE) 1 MG tablet, Take 1 tablet by mouth Daily., Disp: , Rfl:     levothyroxine (SYNTHROID, LEVOTHROID) 25 MCG tablet, Take 1 tablet by mouth Daily., Disp: , Rfl:     Linzess 145 MCG capsule capsule, Take 1 capsule by mouth Every Morning Before Breakfast., Disp: , Rfl:     metoprolol tartrate (LOPRESSOR) 50 MG tablet, Measure heart rate and take 1 tablet if heart rate lower than 70, take 2 tablets if heart rate 70 or higher. Do this at bedtime the night before the CT Scan and 1 hour prior to CT Scan  "appointment, Disp: 4 tablet, Rfl: 0    montelukast (SINGULAIR) 10 MG tablet, Take 1 tablet by mouth Daily., Disp: , Rfl:     pravastatin (PRAVACHOL) 20 MG tablet, Take 1 tablet by mouth Daily., Disp: , Rfl:     topiramate (TOPAMAX) 25 MG tablet, Take 1 tablet by mouth every night at bedtime., Disp: , Rfl:       Review of Systems   Cardiovascular:  Positive for chest pain and dyspnea on exertion. Negative for palpitations and syncope.        Objective     /76   Pulse 72   Ht 165.1 cm (65\")   Wt 78.3 kg (172 lb 9.6 oz)   BMI 28.72 kg/m²       General Appearance:   well developed  well nourished  HENT:   oropharynx moist  lips not cyanotic  Neck:  thyroid not enlarged  supple  Respiratory:  no respiratory distress  normal breath sounds  no rales  Cardiovascular:  no jugular venous distention  regular rhythm  apical impulse normal  S1 normal, S2 normal  no S3, no S4   no murmur  no rub, no thrill  carotid pulses normal; no bruit  pedal pulses normal  lower extremity edema: none    Musculoskeletal:  no clubbing of fingers.   normocephalic, head atraumatic  Skin:   warm, dry  Psychiatric:  judgement and insight appropriate  normal mood and affect      Result Review :     The following data was reviewed by: RICHARD Bartlett on 08/07/2024:                   Darling England  reports that she has never smoked. She has never been exposed to tobacco smoke. She has never used smokeless tobacco. I have educated her on the risk of diseases from using tobacco products such as cancer, COPD, and heart disease.                Assessment and Plan        ASSESSMENT:  Encounter Diagnoses   Name Primary?    Precordial pain Yes    Dyspnea on exertion     Chest pain, atypical          PLAN:    1.  Atypical chest pain and dyspnea, cardiac workup has been unremarkable.  No evidence of ischemic heart disease or structural heart disease.  Will defer to PCP for workup for noncardiac chest pain.  2.  CTA " reports changes in the right lower lung lobe which was only partially visualized.  Recommended baseline CT for further assessment of the lungs.  Will defer to PCP.  Sending report.  3.  Patient can follow-up as needed.          Patient was given instructions and counseling regarding her condition or for health maintenance advice. Please see specific information pulled into the AVS if appropriate.           Juany Sher, APRN   8/7/2024  09:34 EDT

## 2024-08-28 ENCOUNTER — TELEPHONE (OUTPATIENT)
Dept: CARDIOLOGY | Facility: CLINIC | Age: 44
End: 2024-08-28
Payer: COMMERCIAL

## 2024-08-28 NOTE — TELEPHONE ENCOUNTER
REQUEST FOR CARDIAC CLEARANCE    Caller name: Reyes England     Phone Number: [phone]     Surgeon's name: DR. POE    Type of planned surgery: TAKING OUT A HERNIA MESH    Date of planned surgery: 9.9.24    Type of anesthesia: UNKNOWN    Have you been experiencing chest pain or shortness of breath? NO    Is your doctor requesting for you to stop any of your medications prior to your surgery? NO    Where should we fax the clearance to? [phone]

## 2024-09-09 ENCOUNTER — TRANSCRIBE ORDERS (OUTPATIENT)
Dept: ADMINISTRATIVE | Facility: HOSPITAL | Age: 44
End: 2024-09-09
Payer: COMMERCIAL

## 2024-09-09 DIAGNOSIS — M54.2 NECK PAIN: Primary | ICD-10-CM

## 2024-11-08 ENCOUNTER — HOSPITAL ENCOUNTER (OUTPATIENT)
Dept: MRI IMAGING | Facility: HOSPITAL | Age: 44
Discharge: HOME | End: 2024-11-08
Payer: COMMERCIAL

## 2024-11-08 DIAGNOSIS — M54.2 NECK PAIN: ICD-10-CM

## 2024-11-08 PROCEDURE — 72141 MRI NECK SPINE W/O DYE: CPT

## 2025-01-29 ENCOUNTER — HOSPITAL ENCOUNTER (OUTPATIENT)
Dept: OTHER | Facility: HOSPITAL | Age: 45
Discharge: HOME | End: 2025-01-29

## 2025-06-03 NOTE — PROGRESS NOTES
Primary Care Provider  Laureen Whittington APRN   Referring Provider  Laureen Jacinto*      Patient Complaint  Establish Care, Asthma, Shortness of Breath (With shorter distances ), and Cough (Dry )    Subjective          Reyes England presents to [facility]    Patient or patient representative verbalized consent for the use of Ambient Listening during the visit with  RICHARD Medeiros for chart documentation. 6/5/2025  13:42 EDT    History of Presenting Illness  Reyes England is a 44 y.o. female patient with history of asthma, and allergies, here to establish care.    History of Present Illness    Reyes England presents today as a new patient, referred to our clinic by her primary care provider RICHARD Steel for asthma.  She was diagnosed with asthma about 1 year ago.  Prior to this, she had no issues with her breathing.  Patient denies using any antibiotics or steroids for her lungs recently, denies any fevers or chills.  She has never had any ER visits or hospitalizations for her breathing, though required supplemental oxygen for a short time after having hernia surgery last year.  Her primary respiratory complaint is dry cough for the past few months, shortness of breath with exertion, and intermittent wheezing.  She has never smoked but has been exposed to secondhand smoke throughout her life.  Patient denies any hemoptysis, swollen lymph nodes, weight loss, or night sweats.  She has a family history of asthma and COPD in her mother.  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  No recent changes in home environment, no new pets. Overall, patient is doing well and has no additional concerns at this time.  Patient is able to perform ADLs without difficulty.  I have personally reviewed patient's past family, social, medical and surgical histories and agree with their  findings.    She has been on allergy medications for an extended period, which have proven effective. She has undergone allergy testing in the past and received allergy injections from a specialist, which she completed last year.    Pulmonary Meds  Albuterol inhaler  Albuterol nebs  Singulair  Xyzal    Pulmonary Equipment  None        Family History   Problem Relation Age of Onset    No Known Problems Mother     Heart attack Father         Social History     Socioeconomic History    Marital status: Unknown   Tobacco Use    Smoking status: Never     Passive exposure: Current    Smokeless tobacco: Never   Vaping Use    Vaping status: Never Used   Substance and Sexual Activity    Alcohol use: Never    Drug use: Never    Sexual activity: Defer        History reviewed. No pertinent past medical history.     Immunization History   Administered Date(s) Administered    31-influenza Vac Quardvalent Preservativ 10/13/2021    COVID-19 (RADHA) 03/26/2021    COVID-19 (MODERNA) 1st,2nd,3rd Dose Monovalent 11/10/2021    COVID-19 (MODERNA) Monovalent Original Booster 10/27/2021    Fluzone  >6mos 12/20/2013, 10/09/2024    Fluzone (or Fluarix & Flulaval for VFC) >6mos 08/10/2017, 09/25/2019, 09/21/2020    Hepatitis A 09/25/2019    Influenza Injectable Mdck Pf Quad 10/04/2023    Influenza Seasonal Injectable 11/05/2012, 10/06/2021    Pneumococcal Conjugate 20-Valent (PCV20) 04/10/2023    Tdap 04/29/2013       Allergies   Allergen Reactions    Penicillins Hives, Rash and Unknown (See Comments)    Topiramate Unknown - Low Severity     Other Reaction(s): Not available  Per patient- possibly caused kidney stones             Current Outpatient Medications:     albuterol (PROVENTIL) (2.5 MG/3ML) 0.083% nebulizer solution, USE 3 ML VIA NEBULIZER EVERY 4 TO 6 HOURS AS NEEDED, Disp: , Rfl:     benzonatate (TESSALON) 100 MG capsule, Take 1 capsule by mouth Every 8 (Eight) Hours As Needed., Disp: , Rfl:     Cholecalciferol (Vitamin D3) 50 MCG  (2000 UT) capsule, Take 1 capsule by mouth Daily., Disp: , Rfl:     DULoxetine (CYMBALTA) 30 MG capsule, take 1 capsule by mouth every day as directed, Disp: , Rfl:     estradiol (ESTRACE) 1 MG tablet, Take 1 tablet by mouth Daily., Disp: , Rfl:     levocetirizine (XYZAL) 5 MG tablet, Take 1 tablet by mouth Daily., Disp: , Rfl:     levothyroxine (SYNTHROID, LEVOTHROID) 25 MCG tablet, Take 1 tablet by mouth Daily., Disp: , Rfl:     Linzess 145 MCG capsule capsule, Take 1 capsule by mouth Every Morning Before Breakfast., Disp: , Rfl:     medroxyPROGESTERone (PROVERA) 5 MG tablet, Take 1 tablet by mouth Daily., Disp: , Rfl:     montelukast (SINGULAIR) 10 MG tablet, Take 1 tablet by mouth Daily., Disp: , Rfl:     ondansetron (ZOFRAN) 4 MG tablet, Take 1 tablet by mouth., Disp: , Rfl:     polyethylene glycol (MIRALAX) 17 GM/SCOOP powder, MIX AND DRINK 1 CAPFUL TWICE DAILY, Disp: , Rfl:     pravastatin (PRAVACHOL) 20 MG tablet, Take 1 tablet by mouth Daily., Disp: , Rfl:     Qulipta 30 MG tablet, Take 1 tablet by mouth Daily., Disp: , Rfl:     topiramate (TOPAMAX) 25 MG tablet, Take 1 tablet by mouth every night at bedtime., Disp: , Rfl:     uribel (URO-MP) 118 MG capsule capsule, Take 1 capsule by mouth 4 (Four) Times a Day., Disp: , Rfl:        Objective     Vitals:    06/05/25 1313   BP: 122/71   Pulse: 86   Resp: 16   SpO2: 97%       Physical Exam  Constitutional:       General: She is not in acute distress.     Appearance: Normal appearance. She is normal weight. She is not ill-appearing.   HENT:      Right Ear: Tympanic membrane and ear canal normal.      Left Ear: Tympanic membrane and ear canal normal.      Nose: Nose normal.      Mouth/Throat:      Mouth: Mucous membranes are moist.      Pharynx: Oropharynx is clear.   Cardiovascular:      Rate and Rhythm: Normal rate and regular rhythm.      Pulses: Normal pulses.      Heart sounds: Normal heart sounds.   Pulmonary:      Effort: Pulmonary effort is normal. No  respiratory distress.      Breath sounds: Normal breath sounds. No stridor. No wheezing, rhonchi or rales.   Musculoskeletal:         General: No swelling. Normal range of motion.      Cervical back: Normal range of motion and neck supple.      Right lower leg: No edema.      Left lower leg: No edema.   Skin:     General: Skin is warm and dry.   Neurological:      General: No focal deficit present.      Mental Status: She is alert and oriented to person, place, and time.      Motor: No weakness.   Psychiatric:         Mood and Affect: Mood normal.         Behavior: Behavior normal.         Result Review :   I have personally reviewed patient's labs and images.     Results    -PFTs 4/5/2024 mild obstructive defect FEV1 69% of predicted, no significant response to bronchodilators.  Lung volumes, DLCO normal.  -CTA chest 7/29/2024 showed some indeterminate calcifications in the right lower lobe.  There was some linear bands of atelectasis or scarring at the lung bases.  Mild pectus excavatum deformity.  Recommended baseline CT of the chest for broader assessment.  -Echocardiogram 2/2/2024 EF 55 to 60%, no evidence of diastolic dysfunction, normal right ventricular size and systolic function noted       Assessment and Plan      Diagnoses and all orders for this visit:    1. Asthma, unspecified asthma severity, unspecified whether complicated, unspecified whether persistent (Primary)  -     CT Chest With Contrast; Future  -     Alpha - 1 - Antitrypsin; Future  -     CBC & Differential; Future  -     IgE Level; Future  -     Allergens, Zone 8; Future  -     Fungitell B-D Glucan; Future  -     Aspergillus Galactomannan Antigen - Blood, Arm, Left; Future  -     QuantiFERON TB Gold; Future  -     Histoplasma Ag Ur - Urine, Urine, Clean Catch; Future    2. Abnormal chest CT  -     CT Chest With Contrast; Future  -     Alpha - 1 - Antitrypsin; Future  -     CBC & Differential; Future  -     IgE Level; Future  -     Allergens,  Zone 8; Future  -     Fungitell B-D Glucan; Future  -     Aspergillus Galactomannan Antigen - Blood, Arm, Left; Future  -     QuantiFERON TB Gold; Future  -     Histoplasma Ag Ur - Urine, Urine, Clean Catch; Future    3. Environmental allergies    4. Dyspnea on exertion       Assessment & Plan  1. Asthma.  Her pulmonary function test from April 2024 revealed mild obstruction, a common finding in individuals with asthma and COPD. A CT scan of her chest conducted last summer showed calcifications in the lower right lung and scarring at the lung bases. The presence of scarring suggests a past illness, infection, or inflammation. Given her family history of asthma and COPD, it is prudent to screen for alpha-1 antitrypsin deficiency, a genetic disorder that can lead to lung damage over time due to insufficient production of a protective protein. It is also possible that her asthma could be allergy-induced, as allergies are a common trigger for asthma symptoms. At this juncture, no changes or additions to her current treatment regimen are deemed necessary. A repeat CT scan will be ordered to assess the current structural state of her lungs. Additional blood work will be ordered to check inflammatory markers and an allergy panel. She will also be tested for atypical infections such as fungal infections. She is advised to continue using albuterol as needed for now.    2. Cough.  She reports a persistent dry cough for the past 4 weeks, which has not improved with albuterol or the nebulizer machine. The cough could be related to her asthma, allergies, or a potential infection. She uses cough medicine at night to help suppress the cough. Further evaluation will be conducted based on the results of the CT scan and lab tests.    3. Shortness of breath.  She experiences daily shortness of breath, especially with exertion, since her asthma diagnosis last year. This symptom will be monitored, and any necessary adjustments to her  treatment plan will be made based on the findings from the upcoming tests.    -CT chest with contrast ordered today to evaluate for any structural or acute abnormalities of the lungs  -Check alpha-1 antitrypsin-level  -Can consider ordering lab workup for asthma including CBC with differential, IgE, allergy panel, and fungal serologies  -Continue using albuterol inhaler and albuterol nebulizer treatments as needed.  Patient reports she has tried either Spiriva or Symbicort in the past without improvement.  -Continue taking Singulair, Xyzal for allergies  -Follow-up in 2 months after testing completed    Smoking history reviewed.  Vaccination status: Patient reports she is up-to-date with her flu, pneumonia, and Covid vaccines.  Patient is advised to continue to follow CDC recommendations such as social distancing wearing a mask and washing hands for at least 20 seconds.  Medications personally reviewed.    Follow Up   No follow-ups on file.  Patient was given instructions and counseling regarding her condition or for health maintenance advice. Please see specific information pulled into the AVS if appropriate.

## 2025-06-05 ENCOUNTER — OFFICE VISIT (OUTPATIENT)
Dept: PULMONOLOGY | Facility: CLINIC | Age: 45
End: 2025-06-05
Payer: COMMERCIAL

## 2025-06-05 VITALS
OXYGEN SATURATION: 97 % | SYSTOLIC BLOOD PRESSURE: 122 MMHG | RESPIRATION RATE: 16 BRPM | HEIGHT: 65 IN | WEIGHT: 159.2 LBS | HEART RATE: 86 BPM | BODY MASS INDEX: 26.52 KG/M2 | DIASTOLIC BLOOD PRESSURE: 71 MMHG

## 2025-06-05 DIAGNOSIS — Z91.09 ENVIRONMENTAL ALLERGIES: ICD-10-CM

## 2025-06-05 DIAGNOSIS — R93.89 ABNORMAL CHEST CT: ICD-10-CM

## 2025-06-05 DIAGNOSIS — R06.09 DYSPNEA ON EXERTION: ICD-10-CM

## 2025-06-05 DIAGNOSIS — J45.909 ASTHMA, UNSPECIFIED ASTHMA SEVERITY, UNSPECIFIED WHETHER COMPLICATED, UNSPECIFIED WHETHER PERSISTENT: Primary | ICD-10-CM

## 2025-06-05 RX ORDER — POLYETHYLENE GLYCOL 3350 17 G/17G
POWDER, FOR SOLUTION ORAL
COMMUNITY

## 2025-06-05 RX ORDER — METHENAMINE, SODIUM PHOSPHATE, MONOBASIC, MONOHYDRATE, PHENYL SALICYLATE, METHYLENE BLUE, AND HYOSCYAMINE SULFATE 118; 40.8; 36; 10; .12 MG/1; MG/1; MG/1; MG/1; MG/1
1 CAPSULE ORAL 4 TIMES DAILY
COMMUNITY
Start: 2025-02-25

## 2025-06-05 RX ORDER — ONDANSETRON 4 MG/1
4 TABLET, FILM COATED ORAL
COMMUNITY
Start: 2025-02-17

## 2025-06-05 RX ORDER — ATOGEPANT 30 MG/1
1 TABLET ORAL DAILY
COMMUNITY
Start: 2025-04-22

## 2025-06-05 RX ORDER — ACETAMINOPHEN 160 MG
1 TABLET,DISINTEGRATING ORAL DAILY
COMMUNITY
Start: 2025-05-27

## 2025-06-05 RX ORDER — LEVOCETIRIZINE DIHYDROCHLORIDE 5 MG/1
1 TABLET, FILM COATED ORAL DAILY
COMMUNITY
Start: 2025-06-02

## 2025-06-05 RX ORDER — MEDROXYPROGESTERONE ACETATE 5 MG
5 TABLET ORAL DAILY
COMMUNITY

## 2025-06-05 RX ORDER — DULOXETIN HYDROCHLORIDE 30 MG/1
CAPSULE, DELAYED RELEASE ORAL
COMMUNITY
Start: 2025-03-02

## 2025-06-05 RX ORDER — BENZONATATE 100 MG/1
1 CAPSULE ORAL EVERY 8 HOURS PRN
COMMUNITY

## 2025-06-05 RX ORDER — ALBUTEROL SULFATE 0.83 MG/ML
SOLUTION RESPIRATORY (INHALATION)
COMMUNITY
Start: 2025-05-09

## 2025-06-17 ENCOUNTER — LAB (OUTPATIENT)
Dept: LAB | Facility: HOSPITAL | Age: 45
End: 2025-06-17
Payer: COMMERCIAL

## 2025-06-17 ENCOUNTER — HOSPITAL ENCOUNTER (OUTPATIENT)
Dept: CT IMAGING | Facility: HOSPITAL | Age: 45
Discharge: HOME | End: 2025-06-17
Payer: COMMERCIAL

## 2025-06-17 DIAGNOSIS — J45.909 ASTHMA, UNSPECIFIED ASTHMA SEVERITY, UNSPECIFIED WHETHER COMPLICATED, UNSPECIFIED WHETHER PERSISTENT: ICD-10-CM

## 2025-06-17 DIAGNOSIS — R93.89 ABNORMAL CHEST CT: ICD-10-CM

## 2025-06-17 LAB
BASOPHILS # BLD AUTO: 0.02 10*3/MM3 (ref 0–0.2)
BASOPHILS NFR BLD AUTO: 0.2 % (ref 0–1.5)
DEPRECATED RDW RBC AUTO: 52.6 FL (ref 37–54)
EOSINOPHIL # BLD AUTO: 0.11 10*3/MM3 (ref 0–0.4)
EOSINOPHIL NFR BLD AUTO: 1.3 % (ref 0.3–6.2)
ERYTHROCYTE [DISTWIDTH] IN BLOOD BY AUTOMATED COUNT: 18.1 % (ref 12.3–15.4)
HCT VFR BLD AUTO: 44.6 % (ref 34–46.6)
HGB BLD-MCNC: 14 G/DL (ref 12–15.9)
IMM GRANULOCYTES # BLD AUTO: 0.01 10*3/MM3 (ref 0–0.05)
IMM GRANULOCYTES NFR BLD AUTO: 0.1 % (ref 0–0.5)
LYMPHOCYTES # BLD AUTO: 1.98 10*3/MM3 (ref 0.7–3.1)
LYMPHOCYTES NFR BLD AUTO: 24 % (ref 19.6–45.3)
MCH RBC QN AUTO: 25.8 PG (ref 26.6–33)
MCHC RBC AUTO-ENTMCNC: 31.4 G/DL (ref 31.5–35.7)
MCV RBC AUTO: 82.1 FL (ref 79–97)
MONOCYTES # BLD AUTO: 0.56 10*3/MM3 (ref 0.1–0.9)
MONOCYTES NFR BLD AUTO: 6.8 % (ref 5–12)
NEUTROPHILS NFR BLD AUTO: 5.56 10*3/MM3 (ref 1.7–7)
NEUTROPHILS NFR BLD AUTO: 67.6 % (ref 42.7–76)
PLATELET # BLD AUTO: 328 10*3/MM3 (ref 140–450)
PMV BLD AUTO: 8.5 FL (ref 6–12)
RBC # BLD AUTO: 5.43 10*6/MM3 (ref 3.77–5.28)
WBC NRBC COR # BLD AUTO: 8.24 10*3/MM3 (ref 3.4–10.8)

## 2025-06-17 PROCEDURE — 85025 COMPLETE CBC W/AUTO DIFF WBC: CPT

## 2025-06-17 PROCEDURE — 86003 ALLG SPEC IGE CRUDE XTRC EA: CPT

## 2025-06-17 PROCEDURE — 87449 NOS EACH ORGANISM AG IA: CPT

## 2025-06-17 PROCEDURE — 25510000001 IOPAMIDOL PER 1 ML

## 2025-06-17 PROCEDURE — 71260 CT THORAX DX C+: CPT

## 2025-06-17 PROCEDURE — 87305 ASPERGILLUS AG IA: CPT

## 2025-06-17 PROCEDURE — 36415 COLL VENOUS BLD VENIPUNCTURE: CPT

## 2025-06-17 PROCEDURE — 86480 TB TEST CELL IMMUN MEASURE: CPT

## 2025-06-17 PROCEDURE — 82785 ASSAY OF IGE: CPT

## 2025-06-17 RX ORDER — IOPAMIDOL 755 MG/ML
100 INJECTION, SOLUTION INTRAVASCULAR
Status: COMPLETED | OUTPATIENT
Start: 2025-06-17 | End: 2025-06-17

## 2025-06-17 RX ADMIN — IOPAMIDOL 100 ML: 755 INJECTION, SOLUTION INTRAVENOUS at 13:30

## 2025-06-19 ENCOUNTER — LAB (OUTPATIENT)
Dept: LAB | Facility: HOSPITAL | Age: 45
End: 2025-06-19
Payer: COMMERCIAL

## 2025-06-19 DIAGNOSIS — R93.89 ABNORMAL CHEST CT: ICD-10-CM

## 2025-06-19 DIAGNOSIS — J45.909 ASTHMA, UNSPECIFIED ASTHMA SEVERITY, UNSPECIFIED WHETHER COMPLICATED, UNSPECIFIED WHETHER PERSISTENT: ICD-10-CM

## 2025-06-19 LAB
GAMMA INTERFERON BACKGROUND BLD IA-ACNC: 0.02 IU/ML
M TB IFN-G BLD-IMP: NEGATIVE
M TB IFN-G CD4+ BCKGRND COR BLD-ACNC: 0.04 IU/ML
M TB IFN-G CD4+CD8+ BCKGRND COR BLD-ACNC: 0.04 IU/ML
MITOGEN IGNF BCKGRD COR BLD-ACNC: >10 IU/ML
SERVICE CMNT-IMP: NORMAL

## 2025-06-19 PROCEDURE — 87385 HISTOPLASMA CAPSUL AG IA: CPT

## 2025-06-20 LAB
1,3 BETA GLUCAN SER QL: NEGATIVE
1,3 BETA GLUCAN SER-MCNC: 56.25 PG/ML
CITATION REF LAB TEST: NORMAL
IMP & REVIEW OF LAB RESULTS: NORMAL
LAB DIRECTOR NAME PROVIDER: NORMAL
LABORATORY COMMENT REPORT: NORMAL
REF LAB TEST METHOD: NORMAL

## 2025-06-21 LAB
GALACTOMANNAN AG SPEC IA-ACNC: 0.06 INDEX (ref 0–0.49)
IGE SERPL-ACNC: 18 IU/ML (ref 6–495)

## 2025-06-23 LAB
A ALTERNATA IGE QN: <0.1 KU/L
A FUMIGATUS IGE QN: <0.1 KU/L
AMER ROACH IGE QN: <0.1 KU/L
BAHIA GRASS IGE QN: <0.1 KU/L
BERMUDA GRASS IGE QN: <0.1 KU/L
BOXELDER IGE QN: <0.1 KU/L
C HERBARUM IGE QN: <0.1 KU/L
CAT DANDER IGE QN: <0.1 KU/L
CMN PIGWEED IGE QN: <0.1 KU/L
COMMON RAGWEED IGE QN: <0.1 KU/L
D FARINAE IGE QN: <0.1 KU/L
D PTERONYSS IGE QN: <0.1 KU/L
DOG DANDER IGE QN: <0.1 KU/L
ENGL PLANTAIN IGE QN: <0.1 KU/L
HAZELNUT POLN IGE QN: <0.1 KU/L
JOHNSON GRASS IGE QN: <0.1 KU/L
KENT BLUE GRASS IGE QN: <0.1 KU/L
LONDON PLANE IGE QN: <0.1 KU/L
M RACEMOSUS IGE QN: <0.1 KU/L
MT JUNIPER IGE QN: <0.1 KU/L
MUGWORT IGE QN: <0.1 KU/L
NETTLE IGE QN: <0.1 KU/L
P NOTATUM IGE QN: <0.1 KU/L
S BOTRYOSUM IGE QN: <0.1 KU/L
SERVICE CMNT-IMP: NORMAL
SHEEP SORREL IGE QN: <0.1 KU/L
SWEET GUM IGE QN: <0.1 KU/L
WHITE ELM IGE QN: <0.1 KU/L
WHITE HICKORY IGE QN: <0.1 KU/L
WHITE MULBERRY IGE QN: <0.1 KU/L
WHITE OAK IGE QN: <0.1 KU/L

## 2025-06-24 LAB — H CAPSUL AG UR QL IA: NEGATIVE
